# Patient Record
Sex: MALE | Race: WHITE | NOT HISPANIC OR LATINO | Employment: OTHER | URBAN - METROPOLITAN AREA
[De-identification: names, ages, dates, MRNs, and addresses within clinical notes are randomized per-mention and may not be internally consistent; named-entity substitution may affect disease eponyms.]

---

## 2020-11-08 ENCOUNTER — TRANSFERRED RECORDS (OUTPATIENT)
Dept: HEALTH INFORMATION MANAGEMENT | Facility: CLINIC | Age: 75
End: 2020-11-08

## 2020-12-29 DIAGNOSIS — R09.02 HYPOXIA: Primary | ICD-10-CM

## 2021-01-18 ENCOUNTER — OFFICE VISIT (OUTPATIENT)
Dept: SLEEP MEDICINE | Facility: HOSPITAL | Age: 76
End: 2021-01-18
Attending: INTERNAL MEDICINE
Payer: COMMERCIAL

## 2021-01-18 ENCOUNTER — HOSPITAL ENCOUNTER (OUTPATIENT)
Dept: RESPIRATORY THERAPY | Facility: HOSPITAL | Age: 76
End: 2021-01-18
Attending: NURSE PRACTITIONER
Payer: COMMERCIAL

## 2021-01-18 DIAGNOSIS — R09.02 HYPOXIA: Primary | ICD-10-CM

## 2021-01-18 PROCEDURE — 94010 BREATHING CAPACITY TEST: CPT | Mod: 26 | Performed by: INTERNAL MEDICINE

## 2021-01-18 PROCEDURE — 94010 BREATHING CAPACITY TEST: CPT

## 2021-01-18 RX ORDER — ALBUTEROL SULFATE 90 UG/1
2 AEROSOL, METERED RESPIRATORY (INHALATION) EVERY 6 HOURS PRN
Status: DISCONTINUED | OUTPATIENT
Start: 2021-01-18 | End: 2021-01-18 | Stop reason: CLARIF

## 2021-01-18 NOTE — PROGRESS NOTES
Patient picked up over night oximeter number [id number]. Patient was instructed on use of device. Patient verbalized understanding.     Patient will return device tomorrow by: tomorrow in [place]

## 2021-01-19 ENCOUNTER — DOCUMENTATION ONLY (OUTPATIENT)
Dept: SLEEP MEDICINE | Facility: HOSPITAL | Age: 76
End: 2021-01-19
Attending: INTERNAL MEDICINE
Payer: COMMERCIAL

## 2021-01-29 ENCOUNTER — MEDICAL CORRESPONDENCE (OUTPATIENT)
Dept: HEALTH INFORMATION MANAGEMENT | Facility: HOSPITAL | Age: 76
End: 2021-01-29
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
## 2021-03-15 PROBLEM — S42.032A TRAUMATIC CLOSED FRACTURE OF DISTAL CLAVICLE WITH MINIMAL DISPLACEMENT, LEFT, INITIAL ENCOUNTER: Status: ACTIVE | Noted: 2020-11-08

## 2021-03-15 PROBLEM — S22.41XA MULTIPLE FRACTURES OF RIBS, RIGHT SIDE, INITIAL ENCOUNTER FOR CLOSED FRACTURE: Status: ACTIVE | Noted: 2020-11-08

## 2021-03-15 NOTE — PATIENT INSTRUCTIONS
Thank you for allowing Emma Wilson NP and our ENT team to participate in your care.  If your medications are too expensive, please give the nurse a call.  We can possibly change this medication.  If you have a scheduling or an appointment question please contact our Health Unit Coordinator at their direct line [phone].   ALL nursing questions or concerns can be directed to your ENT nurse at: [phone] - [place]    Start taking famotadine 40 mg daily  Start drinking more water - shoot for 64 ounces per day  Stop Astelin nasal spray  Start Ipratropium nasal spray  Follow up in 4-6 weeks, if no improvement we will discuss CT scan of the neck  Warm moist compresses to the neck and stretches

## 2021-03-17 ENCOUNTER — OFFICE VISIT (OUTPATIENT)
Dept: OTOLARYNGOLOGY | Facility: OTHER | Age: 76
End: 2021-03-17
Attending: NURSE PRACTITIONER
Payer: COMMERCIAL

## 2021-03-17 VITALS
SYSTOLIC BLOOD PRESSURE: 136 MMHG | OXYGEN SATURATION: 98 % | HEIGHT: 71 IN | WEIGHT: 216.5 LBS | TEMPERATURE: 97.8 F | HEART RATE: 67 BPM | BODY MASS INDEX: 30.31 KG/M2 | DIASTOLIC BLOOD PRESSURE: 80 MMHG

## 2021-03-17 DIAGNOSIS — R09.82 POST-NASAL DRIP: Primary | ICD-10-CM

## 2021-03-17 DIAGNOSIS — J31.2 SORE THROAT, CHRONIC: ICD-10-CM

## 2021-03-17 PROCEDURE — 92504 EAR MICROSCOPY EXAMINATION: CPT | Performed by: NURSE PRACTITIONER

## 2021-03-17 PROCEDURE — 31575 DIAGNOSTIC LARYNGOSCOPY: CPT | Performed by: NURSE PRACTITIONER

## 2021-03-17 PROCEDURE — G0463 HOSPITAL OUTPT CLINIC VISIT: HCPCS | Mod: 25

## 2021-03-17 PROCEDURE — 99213 OFFICE O/P EST LOW 20 MIN: CPT | Mod: 25 | Performed by: NURSE PRACTITIONER

## 2021-03-17 RX ORDER — IPRATROPIUM BROMIDE 21 UG/1
2 SPRAY, METERED NASAL 2 TIMES DAILY
Qty: 30 ML | Refills: 3 | Status: SHIPPED | OUTPATIENT
Start: 2021-03-17

## 2021-03-17 RX ORDER — TRETINOIN 1 MG/G
CREAM TOPICAL AT BEDTIME
COMMUNITY

## 2021-03-17 RX ORDER — AMLODIPINE BESYLATE 5 MG/1
5 TABLET ORAL DAILY
COMMUNITY

## 2021-03-17 RX ORDER — BUDESONIDE 0.5 MG/2ML
INHALANT ORAL
Qty: 30 ML | Refills: 3 | Status: SHIPPED | OUTPATIENT
Start: 2021-03-17

## 2021-03-17 RX ORDER — VITAMIN B COMPLEX
TABLET ORAL DAILY
COMMUNITY

## 2021-03-17 RX ORDER — ATORVASTATIN CALCIUM 20 MG/1
20 TABLET, FILM COATED ORAL DAILY
COMMUNITY

## 2021-03-17 RX ORDER — OMEGA-3 FATTY ACIDS/FISH OIL 300-1000MG
200 CAPSULE ORAL EVERY 4 HOURS PRN
COMMUNITY

## 2021-03-17 RX ORDER — MUPIROCIN 20 MG/G
OINTMENT TOPICAL 3 TIMES DAILY
COMMUNITY

## 2021-03-17 RX ORDER — FAMOTIDINE 40 MG/1
40 TABLET, FILM COATED ORAL DAILY
COMMUNITY
End: 2021-04-21

## 2021-03-17 RX ORDER — ASPIRIN 81 MG/1
81 TABLET, CHEWABLE ORAL DAILY
COMMUNITY

## 2021-03-17 RX ORDER — CETIRIZINE HYDROCHLORIDE 10 MG/1
10 TABLET ORAL DAILY
COMMUNITY

## 2021-03-17 RX ORDER — LACTOBACILLUS RHAMNOSUS GG 10B CELL
1 CAPSULE ORAL 2 TIMES DAILY
COMMUNITY

## 2021-03-17 ASSESSMENT — PAIN SCALES - GENERAL: PAINLEVEL: MILD PAIN (3)

## 2021-03-17 ASSESSMENT — MIFFLIN-ST. JEOR: SCORE: 1749.17

## 2021-03-17 NOTE — NURSING NOTE
"Chief Complaint   Patient presents with     Throat Problem     sore throat       Initial /80 (BP Location: Left arm, Patient Position: Sitting, Cuff Size: Adult Regular)   Pulse 67   Temp 97.8  F (36.6  C)   Ht 1.803 m (5' 11\")   Wt 98.2 kg (216 lb 8 oz)   SpO2 98%   BMI 30.20 kg/m   Estimated body mass index is 30.2 kg/m  as calculated from the following:    Height as of this encounter: 1.803 m (5' 11\").    Weight as of this encounter: 98.2 kg (216 lb 8 oz).  Medication Reconciliation: complete  Susana Sauceda LPN    "

## 2021-03-17 NOTE — PROGRESS NOTES
Otolaryngology Note         Chief Complaint:     Patient presents with:  Throat Problem: sore throat           History of Present Illness:     He started with sore throat about 1 month ago.  He has seen PCP and been tested for strep and mono.  He was also treated for suspected sinusitis with amoxicillin.  He had mild improvement after abx therapy.   PCP had recommended famotidine but he didn't start.  He has occasional heartburn about 1 time per week.  He takes tums.  Occasional coughing and clearing.       He reports he fell on 11/8 had significant injury.  He had punctured lung, right shoulder injury.  He saw ortho for the right shoulder due to the injury.  He has had 2 cortisone shots in the right shoulder.  He is having some right neck pain.   He also recently laid new florin in his house.      He reports he has been having some post nasal drainage, started on astelin nasal spray and it gave him a runny nose.   He is able to breath through nose.   He has long term decreased smell, he can tasted.  He can smell smoke and has working smoke detectors.    + jasmeet PND.      He denies dysphagia.  He has mild pain with swallowing.   No fevers or chills.  Appetite is normal.   No c/o fatigue.  He does not endorse any shortness of breath or chest pain.       He is drinking 1-2 bottles of water per day  Caffeine - 4 cups of coffee per day  He is former smoker, quit 10 years ago after CVA  No new exposures.       He history of sinus surgery many years ago  He has had recurrent sinusitis.   He has PE tubes in ears, right is still in.  No concerns for hearing loss, flux hearing, otalgia, otorrhea, vertigo    No history of strep/ tonsillitis.   He has history of seasonal allergies           Medications:     Current Outpatient Rx   Medication Sig Dispense Refill     amLODIPine (NORVASC) 5 MG tablet Take 5 mg by mouth daily       aspirin (ASA) 81 MG chewable tablet Take 81 mg by mouth daily       atorvastatin (LIPITOR) 20 MG  "tablet Take 20 mg by mouth daily       budesonide (PULMICORT) 0.5 MG/2ML neb solution Mix 240 ml sarah med sinus rinse, add 0.5 mg budesonide, rinse 1/2 bottle in each nostril twice daily 30 mL 3     cetirizine (ZYRTEC) 10 MG tablet Take 10 mg by mouth daily       famotidine (PEPCID) 40 MG tablet Take 40 mg by mouth daily       ibuprofen (ADVIL/MOTRIN) 200 MG capsule Take 200 mg by mouth every 4 hours as needed for fever       ipratropium (ATROVENT) 0.03 % nasal spray Spray 2 sprays into both nostrils 2 times daily 30 mL 3     lactobacillus rhamnosus, GG, (CULTURELL) capsule Take 1 capsule by mouth 2 times daily       Magnesium Oxide 250 MG TABS        mupirocin (BACTROBAN) 2 % external ointment Apply topically 3 times daily       tretinoin (RETIN-A) 0.1 % external cream Apply topically At Bedtime       Vitamin D3 (CHOLECALCIFEROL) 25 mcg (1000 units) tablet Take by mouth daily              Allergies:     Allergies: Patient has no known allergies.          Past Medical History:     No past medical history on file.         Past Surgical History:     No past surgical history on file.    ENT family history reviewed         Social History:     Social History     Tobacco Use     Smoking status: Not on file   Substance Use Topics     Alcohol use: Not on file     Drug use: Not on file            Review of Systems:     ROS: See HPI         Physical Exam:     /80 (BP Location: Left arm, Patient Position: Sitting, Cuff Size: Adult Regular)   Pulse 67   Temp 97.8  F (36.6  C)   Ht 1.803 m (5' 11\")   Wt 98.2 kg (216 lb 8 oz)   SpO2 98%   BMI 30.20 kg/m      General - The patient is well nourished and well developed, and appears to have good nutritional status.  Alert and oriented to person and place, answers questions and cooperates with examination appropriately.   Head and Face - Normocephalic and atraumatic, with no gross asymmetry noted.  The facial nerve is intact, with strong symmetric movements.  Voice and " Breathing - The patient was breathing comfortably without the use of accessory muscles. There was no wheezing, stridor, or stertor.  The patients voice was clear and strong, and had appropriate pitch and quality.  Ears -The ears were examined under binocular microscopy and with otoscope.  The external auditory canals are patent, the right tympanic membrane is thickened with tympanoslerosis, there is a T-tube that appears in good position with cerumen around the base, I am not able to verify if the T-tube is patent.  The left TM is intact with thickening and tympanosclerosis with central monomeric area.  No obvious effusion or retraction is noted.    Eyes - Extraocular movements intact, sclera were not icteric or injected, conjunctiva were pink and moist.  Mouth - Examination of the oral cavity showed pink, healthy oral mucosa. No lesions or ulcerations noted.  The tongue was mobile and midline, he has upper dentures that were removed for exam, no lesions noted.  Lower partial is not currently in place.      Throat - The walls of the oropharynx were smooth, pink, moist, symmetric, and had no lesions or ulcerations.  The tonsillar pillars and soft palate were symmetric.  The uvula was midline on elevation.    Neck - Normal midline excursion of the laryngotracheal complex during swallowing.  Full range of motion on passive movement.  Palpation of the occipital, submental, submandibular, internal jugular chain, and supraclavicular nodes did not demonstrate any abnormal lymph nodes or masses.  Palpation of the thyroid was soft and smooth, with no nodules or goiter appreciated.  The trachea was mobile and midline.  He has tightness and tenderness noted to the right sternocleidomastoid muscles and posterior trap muscles.    Nose - External contour is symmetric, no gross deflection or scars.  Nasal mucosa is pink and moist with no abnormal mucus.  The septum was intact and the turbinates are enlarged.  No polyps, masses, or  purulence noted on examination.    Attempts at mirror laryngoscopy were not possible due to gag reflex.  Therefore I proceeded with a fiberoptic examination after informed consent.  First I sprayed both sides of the nose with a mixture of lidocaine and neosynephrine.  I then passed the scope through the nasal cavity.     The nasal cavity is remarkable for:  Slight left DNS, normal appearing ITs, MTs, no obvious polypoid change or purulence noted.  Minimal pooling of clear secretions in the NP, cleared with swallowing    The nasopharynx was mucosally covered and symmetric.  The eustachian tube openings were unobstructed.  Going further down I had a clear view of the base of tongue which had normal appearing lingual tonsillar tissue.  There is white debris scattered on the base of the tongue, no lesions, and the vallecula was open.  The epiglottis was smooth and mucosally covered.  The supraglottic larynx was then clearly visualized.  The vocal cords moved smoothly and symmetrically and were pearly white.  There was some mild to moderate arytenoid and interarytenoid edema.  The pyriform sinuses were open and without jasmeet mass or pooling of secretions upon valsalva, and the limited view of the postcricoid region did not show any lesions.  The patient tolerated the procedure well.           Assessment and Plan:       ICD-10-CM    1. Post-nasal drip  R09.82 budesonide (PULMICORT) 0.5 MG/2ML neb solution     ipratropium (ATROVENT) 0.03 % nasal spray   2. Sore throat, chronic  J31.2 budesonide (PULMICORT) 0.5 MG/2ML neb solution     ipratropium (ATROVENT) 0.03 % nasal spray       Start taking famotadine 40 mg daily  Start drinking more water - shoot for 64 ounces per day  Start budesonide rinses 2 times daily for PND  Stop Astelin nasal spray  Start Ipratropium nasal spray  Follow up in 4-6 weeks, if no improvement we will discuss CT scan of the neck  Warm moist compresses to the neck and stretches      Emma Wilson  NP-C  [facility]      Scope # [id number]

## 2021-03-17 NOTE — LETTER
3/17/2021         RE: Bernard Stout  [address] [place] [id number]        Dear Colleague,    Thank you for referring your patient, Bernard Stout, to [facility] Please see a copy of my visit note below.    Otolaryngology Note         Chief Complaint:     Patient presents with:  Throat Problem: sore throat           History of Present Illness:     He started with sore throat about 1 month ago.  He has seen PCP and been tested for strep and mono.  He was also treated for suspected sinusitis with amoxicillin.  He had mild improvement after abx therapy.   PCP had recommended famotidine but he didn't start.  He has occasional heartburn about 1 time per week.  He takes tums.  Occasional coughing and clearing.       He reports he fell on 11/8 had significant injury.  He had punctured lung, right shoulder injury.  He saw ortho for the right shoulder due to the injury.  He has had 2 cortisone shots in the right shoulder.  He is having some right neck pain.   He also recently laid new florin in his house.      He reports he has been having some post nasal drainage, started on astelin nasal spray and it gave him a runny nose.   He is able to breath through nose.   He has long term decreased smell, he can tasted.  He can smell smoke and has working smoke detectors.    + jasmeet PND.      He denies dysphagia.  He has mild pain with swallowing.   No fevers or chills.  Appetite is normal.   No c/o fatigue.  He does not endorse any shortness of breath or chest pain.       He is drinking 1-2 bottles of water per day  Caffeine - 4 cups of coffee per day  He is former smoker, quit 10 years ago after CVA  No new exposures.       He history of sinus surgery many years ago  He has had recurrent sinusitis.   He has PE tubes in ears, right is still in.  No concerns for hearing loss, flux hearing, otalgia, otorrhea, vertigo    No history of strep/ tonsillitis.   He has history of seasonal  "allergies           Medications:     Current Outpatient Rx   Medication Sig Dispense Refill     amLODIPine (NORVASC) 5 MG tablet Take 5 mg by mouth daily       aspirin (ASA) 81 MG chewable tablet Take 81 mg by mouth daily       atorvastatin (LIPITOR) 20 MG tablet Take 20 mg by mouth daily       budesonide (PULMICORT) 0.5 MG/2ML neb solution Mix 240 ml sarah med sinus rinse, add 0.5 mg budesonide, rinse 1/2 bottle in each nostril twice daily 30 mL 3     cetirizine (ZYRTEC) 10 MG tablet Take 10 mg by mouth daily       famotidine (PEPCID) 40 MG tablet Take 40 mg by mouth daily       ibuprofen (ADVIL/MOTRIN) 200 MG capsule Take 200 mg by mouth every 4 hours as needed for fever       ipratropium (ATROVENT) 0.03 % nasal spray Spray 2 sprays into both nostrils 2 times daily 30 mL 3     lactobacillus rhamnosus, GG, (CULTURELL) capsule Take 1 capsule by mouth 2 times daily       Magnesium Oxide 250 MG TABS        mupirocin (BACTROBAN) 2 % external ointment Apply topically 3 times daily       tretinoin (RETIN-A) 0.1 % external cream Apply topically At Bedtime       Vitamin D3 (CHOLECALCIFEROL) 25 mcg (1000 units) tablet Take by mouth daily              Allergies:     Allergies: Patient has no known allergies.          Past Medical History:     No past medical history on file.         Past Surgical History:     No past surgical history on file.    ENT family history reviewed         Social History:     Social History     Tobacco Use     Smoking status: Not on file   Substance Use Topics     Alcohol use: Not on file     Drug use: Not on file            Review of Systems:     ROS: See HPI         Physical Exam:     /80 (BP Location: Left arm, Patient Position: Sitting, Cuff Size: Adult Regular)   Pulse 67   Temp 97.8  F (36.6  C)   Ht 1.803 m (5' 11\")   Wt 98.2 kg (216 lb 8 oz)   SpO2 98%   BMI 30.20 kg/m      General - The patient is well nourished and well developed, and appears to have good nutritional status.  " Alert and oriented to person and place, answers questions and cooperates with examination appropriately.   Head and Face - Normocephalic and atraumatic, with no gross asymmetry noted.  The facial nerve is intact, with strong symmetric movements.  Voice and Breathing - The patient was breathing comfortably without the use of accessory muscles. There was no wheezing, stridor, or stertor.  The patients voice was clear and strong, and had appropriate pitch and quality.  Ears -The ears were examined under binocular microscopy and with otoscope.  The external auditory canals are patent, the right tympanic membrane is thickened with tympanoslerosis, there is a T-tube that appears in good position with cerumen around the base, I am not able to verify if the T-tube is patent.  The left TM is intact with thickening and tympanosclerosis with central monomeric area.  No obvious effusion or retraction is noted.    Eyes - Extraocular movements intact, sclera were not icteric or injected, conjunctiva were pink and moist.  Mouth - Examination of the oral cavity showed pink, healthy oral mucosa. No lesions or ulcerations noted.  The tongue was mobile and midline, he has upper dentures that were removed for exam, no lesions noted.  Lower partial is not currently in place.      Throat - The walls of the oropharynx were smooth, pink, moist, symmetric, and had no lesions or ulcerations.  The tonsillar pillars and soft palate were symmetric.  The uvula was midline on elevation.    Neck - Normal midline excursion of the laryngotracheal complex during swallowing.  Full range of motion on passive movement.  Palpation of the occipital, submental, submandibular, internal jugular chain, and supraclavicular nodes did not demonstrate any abnormal lymph nodes or masses.  Palpation of the thyroid was soft and smooth, with no nodules or goiter appreciated.  The trachea was mobile and midline.  He has tightness and tenderness noted to the right  sternocleidomastoid muscles and posterior trap muscles.    Nose - External contour is symmetric, no gross deflection or scars.  Nasal mucosa is pink and moist with no abnormal mucus.  The septum was intact and the turbinates are enlarged.  No polyps, masses, or purulence noted on examination.    Attempts at mirror laryngoscopy were not possible due to gag reflex.  Therefore I proceeded with a fiberoptic examination after informed consent.  First I sprayed both sides of the nose with a mixture of lidocaine and neosynephrine.  I then passed the scope through the nasal cavity.     The nasal cavity is remarkable for:  Slight left DNS, normal appearing ITs, MTs, no obvious polypoid change or purulence noted.  Minimal pooling of clear secretions in the NP, cleared with swallowing    The nasopharynx was mucosally covered and symmetric.  The eustachian tube openings were unobstructed.  Going further down I had a clear view of the base of tongue which had normal appearing lingual tonsillar tissue.  There is white debris scattered on the base of the tongue, no lesions, and the vallecula was open.  The epiglottis was smooth and mucosally covered.  The supraglottic larynx was then clearly visualized.  The vocal cords moved smoothly and symmetrically and were pearly white.  There was some mild to moderate arytenoid and interarytenoid edema.  The pyriform sinuses were open and without jasmeet mass or pooling of secretions upon valsalva, and the limited view of the postcricoid region did not show any lesions.  The patient tolerated the procedure well.           Assessment and Plan:       ICD-10-CM    1. Post-nasal drip  R09.82 budesonide (PULMICORT) 0.5 MG/2ML neb solution     ipratropium (ATROVENT) 0.03 % nasal spray   2. Sore throat, chronic  J31.2 budesonide (PULMICORT) 0.5 MG/2ML neb solution     ipratropium (ATROVENT) 0.03 % nasal spray       Start taking famotadine 40 mg daily  Start drinking more water - shoot for 64 ounces  per day  Start budesonide rinses 2 times daily for PND  Stop Astelin nasal spray  Start Ipratropium nasal spray  Follow up in 4-6 weeks, if no improvement we will discuss CT scan of the neck  Warm moist compresses to the neck and stretches      Emma MULLINS  [facility] ENT          Again, thank you for allowing me to participate in the care of your patient.        Sincerely,        Emma Wilson NP

## 2021-04-18 ENCOUNTER — HEALTH MAINTENANCE LETTER (OUTPATIENT)
Age: 76
End: 2021-04-18

## 2021-04-21 ENCOUNTER — TELEPHONE (OUTPATIENT)
Dept: OTOLARYNGOLOGY | Facility: OTHER | Age: 76
End: 2021-04-21

## 2021-04-21 ENCOUNTER — VIRTUAL VISIT (OUTPATIENT)
Dept: OTOLARYNGOLOGY | Facility: OTHER | Age: 76
End: 2021-04-21
Attending: NURSE PRACTITIONER
Payer: COMMERCIAL

## 2021-04-21 DIAGNOSIS — H92.11 OTORRHEA, RIGHT: Primary | ICD-10-CM

## 2021-04-21 DIAGNOSIS — K21.9 GASTROESOPHAGEAL REFLUX DISEASE, UNSPECIFIED WHETHER ESOPHAGITIS PRESENT: ICD-10-CM

## 2021-04-21 DIAGNOSIS — K21.9 LPRD (LARYNGOPHARYNGEAL REFLUX DISEASE): ICD-10-CM

## 2021-04-21 PROCEDURE — 99213 OFFICE O/P EST LOW 20 MIN: CPT | Mod: TEL | Performed by: NURSE PRACTITIONER

## 2021-04-21 RX ORDER — MONTELUKAST SODIUM 10 MG/1
10 TABLET ORAL AT BEDTIME
COMMUNITY

## 2021-04-21 RX ORDER — FAMOTIDINE 40 MG/1
40 TABLET, FILM COATED ORAL DAILY
Qty: 90 TABLET | Refills: 3 | Status: SHIPPED | OUTPATIENT
Start: 2021-04-21 | End: 2022-04-25

## 2021-04-21 RX ORDER — OFLOXACIN 3 MG/ML
5 SOLUTION AURICULAR (OTIC) 2 TIMES DAILY
Qty: 4 ML | Refills: 0 | Status: SHIPPED | OUTPATIENT
Start: 2021-04-21 | End: 2021-04-28

## 2021-04-21 RX ORDER — LISINOPRIL 20 MG/1
20 TABLET ORAL DAILY
COMMUNITY

## 2021-04-21 NOTE — NURSING NOTE
"No chief complaint on file.      Initial There were no vitals taken for this visit. Estimated body mass index is 30.2 kg/m  as calculated from the following:    Height as of 3/17/21: 1.803 m (5' 11\").    Weight as of 3/17/21: 98.2 kg (216 lb 8 oz).  Medication Reconciliation: complete  Susana Sauceda LPN      "

## 2021-04-21 NOTE — LETTER
4/21/2021         RE: Bernard Stout  [address] [id number]        Dear Colleague,    Thank you for referring your patient, Bernard Stout, to the [facility]. Please see a copy of my visit note below.    Bernard is a 75 year old who is being evaluated via a billable telephone visit.      What phone number would you like to be contacted at? [phone]  How would you like to obtain your AVS? Mail a copy    Otolaryngology Note         Chief Complaint:     Patient presents with:  Follow Up: Sore Throat; Post Nasal Drip            History of Present Illness:     Bernard Stout is a 75 year old male seen today for follow up sore throat.  He reports that his sore throat has been resolved for the past week.      He has had some drainage in his right ear with slight ear ache.  He can hear squishy sound when he pushes on his ear.  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  He is not currently on otics.      He has doing budesonide rinses and astelin nasal spray with improvement in post nasal drip.  He continues to have some mild PND.  He has not used the ipratropium that I sent in.     He was taking famotadine consistently until he ran out.  GERD was well controlled when he wast taking it, has now had some breakthrough since he has been off.   He has been drinking more water.            Medications:     Current Outpatient Rx   Medication Sig Dispense Refill     amLODIPine (NORVASC) 5 MG tablet Take 5 mg by mouth daily       aspirin (ASA) 81 MG chewable tablet Take 81 mg by mouth daily       atorvastatin (LIPITOR) 20 MG tablet Take 20 mg by mouth daily       budesonide (PULMICORT) 0.5 MG/2ML neb solution Mix 240 ml sarah med sinus rinse, add 0.5 mg budesonide, rinse 1/2 bottle in each nostril twice daily 30 mL 3     cetirizine (ZYRTEC) 10 MG tablet Take 10 mg by mouth daily       famotidine (PEPCID) 40 MG tablet Take 1 tablet (40 mg) by mouth daily 90 tablet 3      ibuprofen (ADVIL/MOTRIN) 200 MG capsule Take 200 mg by mouth every 4 hours as needed for fever       ipratropium (ATROVENT) 0.03 % nasal spray Spray 2 sprays into both nostrils 2 times daily 30 mL 3     lactobacillus rhamnosus, GG, (CULTURELL) capsule Take 1 capsule by mouth 2 times daily       lisinopril (ZESTRIL) 20 MG tablet Take 20 mg by mouth daily       Magnesium Oxide 250 MG TABS        montelukast (SINGULAIR) 10 MG tablet Take 10 mg by mouth At Bedtime       mupirocin (BACTROBAN) 2 % external ointment Apply topically 3 times daily       ofloxacin (FLOXIN) 0.3 % otic solution Place 5 drops into the right ear 2 times daily for 7 days 4 mL 0     tretinoin (RETIN-A) 0.1 % external cream Apply topically At Bedtime       Vitamin D3 (CHOLECALCIFEROL) 25 mcg (1000 units) tablet Take by mouth daily              Allergies:     Allergies: Patient has no known allergies.          Past Medical History:     No past medical history on file.         Past Surgical History:     No past surgical history on file.    ENT family history reviewed         Social History:     Social History     Tobacco Use     Smoking status: Never Smoker     Smokeless tobacco: Never Used   Substance Use Topics     Alcohol use: None     Drug use: None            Review of Systems:     ROS: See HPI         Physical Exam:     General - The patient is alert and oriented to person and place, answers questions and cooperates with telephone appointment  appropriately.   Voice and Breathing - The patient was talking in full sentences without audible signs of shortness of breath.  There was no audible wheezing, stridor, or stertor.  The patients voice was clear and strong, and had appropriate pitch and quality.           Assessment and Plan:       ICD-10-CM    1. Otorrhea, right  H92.11 ofloxacin (FLOXIN) 0.3 % otic solution   2. Gastroesophageal reflux disease, unspecified whether esophagitis present  K21.9 famotidine (PEPCID) 40 MG tablet   3. LPRD  (laryngopharyngeal reflux disease)  K21.9 famotidine (PEPCID) 40 MG tablet     Start ofloxacin drops as prescribed  Follow up next week if drainage does not resolve  Restart famotidine, refills sent  Continue LPR precautions.   Follow up in 6 months    Phone call duration: 9 minutes        Again, thank you for allowing me to participate in the care of your patient.        Sincerely,        Emma Wilson NP

## 2021-04-21 NOTE — TELEPHONE ENCOUNTER
04/21/21 received my chart message patient had to cancel his appt today because he thought it was tomorrow. I called him to see about rescheduling him and he stated his sore throat is better and was wondering if Emma could do a telephone visit instead of in person.      Please advise    Thank you

## 2021-04-21 NOTE — PROGRESS NOTES
Bernard is a 75 year old who is being evaluated via a billable telephone visit.      What phone number would you like to be contacted at? [phone]  How would you like to obtain your AVS? Mail a copy    Otolaryngology Note         Chief Complaint:     Patient presents with:  Follow Up: Sore Throat; Post Nasal Drip            History of Present Illness:     Bernard Stout is a 75 year old male seen today for follow up sore throat.  He reports that his sore throat has been resolved for the past week.      He has had some drainage in his right ear with slight ear ache.  He can hear squishy sound when he pushes on his ear.  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  He is not currently on otics.      He has doing budesonide rinses and astelin nasal spray with improvement in post nasal drip.  He continues to have some mild PND.  He has not used the ipratropium that I sent in.     He was taking famotadine consistently until he ran out.  GERD was well controlled when he wast taking it, has now had some breakthrough since he has been off.   He has been drinking more water.            Medications:     Current Outpatient Rx   Medication Sig Dispense Refill     amLODIPine (NORVASC) 5 MG tablet Take 5 mg by mouth daily       aspirin (ASA) 81 MG chewable tablet Take 81 mg by mouth daily       atorvastatin (LIPITOR) 20 MG tablet Take 20 mg by mouth daily       budesonide (PULMICORT) 0.5 MG/2ML neb solution Mix 240 ml sarah med sinus rinse, add 0.5 mg budesonide, rinse 1/2 bottle in each nostril twice daily 30 mL 3     cetirizine (ZYRTEC) 10 MG tablet Take 10 mg by mouth daily       famotidine (PEPCID) 40 MG tablet Take 1 tablet (40 mg) by mouth daily 90 tablet 3     ibuprofen (ADVIL/MOTRIN) 200 MG capsule Take 200 mg by mouth every 4 hours as needed for fever       ipratropium (ATROVENT) 0.03 % nasal spray Spray 2 sprays into both nostrils 2 times daily 30 mL 3     lactobacillus rhamnosus, GG, (CULTURELL) capsule  Take 1 capsule by mouth 2 times daily       lisinopril (ZESTRIL) 20 MG tablet Take 20 mg by mouth daily       Magnesium Oxide 250 MG TABS        montelukast (SINGULAIR) 10 MG tablet Take 10 mg by mouth At Bedtime       mupirocin (BACTROBAN) 2 % external ointment Apply topically 3 times daily       ofloxacin (FLOXIN) 0.3 % otic solution Place 5 drops into the right ear 2 times daily for 7 days 4 mL 0     tretinoin (RETIN-A) 0.1 % external cream Apply topically At Bedtime       Vitamin D3 (CHOLECALCIFEROL) 25 mcg (1000 units) tablet Take by mouth daily              Allergies:     Allergies: Patient has no known allergies.          Past Medical History:     No past medical history on file.         Past Surgical History:     No past surgical history on file.    ENT family history reviewed         Social History:     Social History     Tobacco Use     Smoking status: Never Smoker     Smokeless tobacco: Never Used   Substance Use Topics     Alcohol use: None     Drug use: None            Review of Systems:     ROS: See HPI         Physical Exam:     General - The patient is alert and oriented to person and place, answers questions and cooperates with telephone appointment  appropriately.   Voice and Breathing - The patient was talking in full sentences without audible signs of shortness of breath.  There was no audible wheezing, stridor, or stertor.  The patients voice was clear and strong, and had appropriate pitch and quality.           Assessment and Plan:       ICD-10-CM    1. Otorrhea, right  H92.11 ofloxacin (FLOXIN) 0.3 % otic solution   2. Gastroesophageal reflux disease, unspecified whether esophagitis present  K21.9 famotidine (PEPCID) 40 MG tablet   3. LPRD (laryngopharyngeal reflux disease)  K21.9 famotidine (PEPCID) 40 MG tablet     Start ofloxacin drops as prescribed  Follow up next week if drainage does not resolve  Restart famotidine, refills sent  Continue LPR precautions.   Follow up in 6 months    Phone  call duration: 9 minutes

## 2021-05-03 ENCOUNTER — TRANSFERRED RECORDS (OUTPATIENT)
Dept: HEALTH INFORMATION MANAGEMENT | Facility: CLINIC | Age: 76
End: 2021-05-03

## 2021-05-19 ENCOUNTER — HOSPITAL ENCOUNTER (OUTPATIENT)
Dept: CT IMAGING | Facility: HOSPITAL | Age: 76
Discharge: HOME | End: 2021-05-19
Attending: INTERNAL MEDICINE | Admitting: INTERNAL MEDICINE
Payer: COMMERCIAL

## 2021-05-19 DIAGNOSIS — R91.8 OTHER NONSPECIFIC ABNORMAL FINDING OF LUNG FIELD: ICD-10-CM

## 2021-05-19 PROCEDURE — 71250 CT THORAX DX C-: CPT

## 2021-05-26 DIAGNOSIS — R91.8 OTHER NONSPECIFIC ABNORMAL FINDING OF LUNG FIELD: Primary | ICD-10-CM

## 2021-10-03 ENCOUNTER — HEALTH MAINTENANCE LETTER (OUTPATIENT)
Age: 76
End: 2021-10-03

## 2022-03-31 ENCOUNTER — MEDICAL CORRESPONDENCE (OUTPATIENT)
Dept: CT IMAGING | Facility: HOSPITAL | Age: 77
End: 2022-03-31
Payer: COMMERCIAL

## 2022-04-23 DIAGNOSIS — K21.9 GASTROESOPHAGEAL REFLUX DISEASE, UNSPECIFIED WHETHER ESOPHAGITIS PRESENT: ICD-10-CM

## 2022-04-23 DIAGNOSIS — K21.9 LPRD (LARYNGOPHARYNGEAL REFLUX DISEASE): ICD-10-CM

## 2022-04-25 RX ORDER — FAMOTIDINE 40 MG/1
40 TABLET, FILM COATED ORAL DAILY
Qty: 90 TABLET | Refills: 0 | Status: SHIPPED | OUTPATIENT
Start: 2022-04-25

## 2022-04-25 NOTE — TELEPHONE ENCOUNTER
Pepcid   Last Written Prescription Date:4.21.2021    Last Fill Quantity: 90,   # refills: 3  Last Office Visit: 4.21.2021  Future Office visit:       Routing refill request to provider for review/approval because:     H2 Blockers Protocol Failed 04/23/2022 01:00 AM   Protocol Details  Recent (12 mo) or future (30 days) visit within the authorizing provider's specialty        Pended.    Cecy Bradley RN

## 2022-05-14 ENCOUNTER — HEALTH MAINTENANCE LETTER (OUTPATIENT)
Age: 77
End: 2022-05-14

## 2022-05-18 ENCOUNTER — HOSPITAL ENCOUNTER (OUTPATIENT)
Dept: CT IMAGING | Facility: HOSPITAL | Age: 77
Discharge: HOME | End: 2022-05-18
Attending: INTERNAL MEDICINE | Admitting: INTERNAL MEDICINE
Payer: COMMERCIAL

## 2022-05-18 DIAGNOSIS — R91.8 OTHER NONSPECIFIC ABNORMAL FINDING OF LUNG FIELD: ICD-10-CM

## 2022-05-18 PROCEDURE — 71250 CT THORAX DX C-: CPT

## 2022-09-04 ENCOUNTER — HEALTH MAINTENANCE LETTER (OUTPATIENT)
Age: 77
End: 2022-09-04

## 2023-06-03 ENCOUNTER — HEALTH MAINTENANCE LETTER (OUTPATIENT)
Age: 78
End: 2023-06-03

## 2024-07-06 ENCOUNTER — HEALTH MAINTENANCE LETTER (OUTPATIENT)
Age: 79
End: 2024-07-06

## 2025-01-07 NOTE — PROGRESS NOTES
Problem: Prexisting or High Potential for Compromised Skin Integrity  Goal: Skin integrity is maintained or improved  Description: INTERVENTIONS:  - Identify patients at risk for skin breakdown  - Assess and monitor skin integrity  - Assess and monitor nutrition and hydration status  - Monitor labs   - Assess for incontinence   - Turn and reposition patient  - Assist with mobility/ambulation  - Relieve pressure over bony prominences  - Avoid friction and shearing  - Provide appropriate hygiene as needed including keeping skin clean and dry  - Evaluate need for skin moisturizer/barrier cream  - Collaborate with interdisciplinary team   - Patient/family teaching  - Consider wound care consult   Outcome: Progressing     Problem: PAIN - ADULT  Goal: Verbalizes/displays adequate comfort level or baseline comfort level  Description: Interventions:  - Encourage patient to monitor pain and request assistance  - Assess pain using appropriate pain scale  - Administer analgesics based on type and severity of pain and evaluate response  - Implement non-pharmacological measures as appropriate and evaluate response  - Consider cultural and social influences on pain and pain management  - Notify physician/advanced practitioner if interventions unsuccessful or patient reports new pain  Outcome: Progressing     Problem: INFECTION - ADULT  Goal: Absence or prevention of progression during hospitalization  Description: INTERVENTIONS:  - Assess and monitor for signs and symptoms of infection  - Monitor lab/diagnostic results  - Monitor all insertion sites, i.e. indwelling lines, tubes, and drains  - Monitor endotracheal if appropriate and nasal secretions for changes in amount and color  - [place] appropriate cooling/warming therapies per order  - Administer medications as ordered  - Instruct and encourage patient and family to use good hand hygiene technique  - Identify and instruct in appropriate isolation precautions for  patient returned the wrist ox. The data was downloaded and the report was sent to Dr. Kennedy and to the Patient  HPI      ROS      Physical Exam       identified infection/condition  Outcome: Progressing  Goal: Absence of fever/infection during neutropenic period  Description: INTERVENTIONS:  - Monitor WBC    Outcome: Progressing     Problem: SAFETY ADULT  Goal: Patient will remain free of falls  Description: INTERVENTIONS:  - Educate patient/family on patient safety including physical limitations  - Instruct patient to call for assistance with activity   - Consult OT/PT to assist with strengthening/mobility   - Keep Call bell within reach  - Keep bed low and locked with side rails adjusted as appropriate  - Keep care items and personal belongings within reach  - Initiate and maintain comfort rounds  - Make Fall Risk Sign visible to staff  - Apply yellow socks and bracelet for high fall risk patients  - Consider moving patient to room near nurses station  Outcome: Progressing  Goal: Maintain or return to baseline ADL function  Description: INTERVENTIONS:  -  Assess patient's ability to carry out ADLs; assess patient's baseline for ADL function and identify physical deficits which impact ability to perform ADLs (bathing, care of mouth/teeth, toileting, grooming, dressing, etc.)  - Assess/evaluate cause of self-care deficits   - Assess range of motion  - Assess patient's mobility; develop plan if impaired  - Assess patient's need for assistive devices and provide as appropriate  - Encourage maximum independence but intervene and supervise when necessary  - Involve family in performance of ADLs  - Assess for home care needs following discharge   - Consider OT consult to assist with ADL evaluation and planning for discharge  - Provide patient education as appropriate  Outcome: Progressing  Goal: Maintains/Returns to pre admission functional level  Description: INTERVENTIONS:  - Perform AM-PAC 6 Click Basic Mobility/ Daily Activity assessment daily.  - Set and communicate daily mobility goal to care team and patient/family/caregiver.   - Collaborate with rehabilitation  services on mobility goals if consulted  - Out of bed for toileting  - Record patient progress and toleration of activity level   Outcome: Progressing     Problem: DISCHARGE PLANNING  Goal: Discharge to home or other facility with appropriate resources  Description: INTERVENTIONS:  - Identify barriers to discharge w/patient and caregiver  - Arrange for needed discharge resources and transportation as appropriate  - Identify discharge learning needs (meds, wound care, etc.)  - Arrange for interpretive services to assist at discharge as needed  - Refer to Case Management Department for coordinating discharge planning if the patient needs post-hospital services based on physician/advanced practitioner order or complex needs related to functional status, cognitive ability, or social support system  Outcome: Progressing     Problem: Knowledge Deficit  Goal: Patient/family/caregiver demonstrates understanding of disease process, treatment plan, medications, and discharge instructions  Description: Complete learning assessment and assess knowledge base.  Interventions:  - Provide teaching at level of understanding  - Provide teaching via preferred learning methods  Outcome: Progressing     Problem: CARDIOVASCULAR - ADULT  Goal: Maintains optimal cardiac output and hemodynamic stability  Description: INTERVENTIONS:  - Monitor I/O, vital signs and rhythm  - Monitor for S/S and trends of decreased cardiac output  - Administer and titrate ordered vasoactive medications to optimize hemodynamic stability  - Assess quality of pulses, skin color and temperature  - Assess for signs of decreased coronary artery perfusion  - Instruct patient to report change in severity of symptoms  Outcome: Progressing  Goal: Absence of cardiac dysrhythmias or at baseline rhythm  Description: INTERVENTIONS:  - Continuous cardiac monitoring, vital signs, obtain 12 lead EKG if ordered  - Administer antiarrhythmic and heart rate control medications as  ordered  - Monitor electrolytes and administer replacement therapy as ordered  Outcome: Progressing     Problem: METABOLIC, FLUID AND ELECTROLYTES - ADULT  Goal: Electrolytes maintained within normal limits  Description: INTERVENTIONS:  - Monitor labs and assess patient for signs and symptoms of electrolyte imbalances  - Administer electrolyte replacement as ordered  - Monitor response to electrolyte replacements, including repeat lab results as appropriate  - Instruct patient on fluid and nutrition as appropriate  Outcome: Progressing  Goal: Fluid balance maintained  Description: INTERVENTIONS:  - Monitor labs   - Monitor I/O and WT  - Instruct patient on fluid and nutrition as appropriate  - Assess for signs & symptoms of volume excess or deficit  Outcome: Progressing  Goal: Glucose maintained within target range  Description: INTERVENTIONS:  - Monitor Blood Glucose as ordered  - Assess for signs and symptoms of hyperglycemia and hypoglycemia  - Administer ordered medications to maintain glucose within target range  - Assess nutritional intake and initiate nutrition service referral as needed  Outcome: Progressing     Problem: SKIN/TISSUE INTEGRITY - ADULT  Goal: Skin Integrity remains intact(Skin Breakdown Prevention)  Description: Assess:  -Inspect skin when repositioning, toileting, and assisting with ADLS  -Assess extremities for adequate circulation and sensation     Bed Management:  -Have minimal linens on bed & keep smooth, unwrinkled  -Change linens as needed when moist or perspiring  -Avoid sitting or lying in one position for more than 2 hours while in bed  -Keep HOB at 45 degrees     Toileting:  -Offer bedside commode    Activity:  -Encourage activity and walks on unit  -Encourage or provide ROM exercises   -Use appropriate equipment to lift or move patient in bed    Skin Care:  -Avoid use of baby powder, tape, friction and shearing, hot water or constrictive clothing  -Do not massage red bony  areas    Outcome: Progressing  Goal: Incision(s), wounds(s) or drain site(s) healing without S/S of infection  Description: INTERVENTIONS  - Assess and document dressing, incision, wound bed, drain sites and surrounding tissue  - Provide patient and family education  Outcome: Progressing  Goal: Pressure injury heals and does not worsen  Description: Interventions:  - Implement low air loss mattress or specialty surface (Criteria met)  - Apply silicone foam dressing  - Consider nutrition services referral as needed  Outcome: Progressing     Problem: MUSCULOSKELETAL - ADULT  Goal: Maintain or return mobility to safest level of function  Description: INTERVENTIONS:  - Assess patient's ability to carry out ADLs; assess patient's baseline for ADL function and identify physical deficits which impact ability to perform ADLs (bathing, care of mouth/teeth, toileting, grooming, dressing, etc.)  - Assess/evaluate cause of self-care deficits   - Assess range of motion  - Assess patient's mobility  - Assess patient's need for assistive devices and provide as appropriate  - Encourage maximum independence but intervene and supervise when necessary  - Involve family in performance of ADLs  - Assess for home care needs following discharge   - Consider OT consult to assist with ADL evaluation and planning for discharge  - Provide patient education as appropriate  Outcome: Progressing  Goal: Maintain proper alignment of affected body part  Description: INTERVENTIONS:  - Support, maintain and protect limb and body alignment  - Provide patient/ family with appropriate education  Outcome: Progressing